# Patient Record
Sex: FEMALE | Race: BLACK OR AFRICAN AMERICAN | NOT HISPANIC OR LATINO | URBAN - METROPOLITAN AREA
[De-identification: names, ages, dates, MRNs, and addresses within clinical notes are randomized per-mention and may not be internally consistent; named-entity substitution may affect disease eponyms.]

---

## 2023-01-27 ENCOUNTER — OFFICE (OUTPATIENT)
Dept: URBAN - METROPOLITAN AREA CLINIC 79 | Facility: CLINIC | Age: 53
End: 2023-01-27

## 2023-01-27 VITALS
DIASTOLIC BLOOD PRESSURE: 85 MMHG | TEMPERATURE: 98.1 F | WEIGHT: 237 LBS | SYSTOLIC BLOOD PRESSURE: 119 MMHG | HEART RATE: 88 BPM | HEIGHT: 69 IN

## 2023-01-27 DIAGNOSIS — Z98.890 OTHER SPECIFIED POSTPROCEDURAL STATES: ICD-10-CM

## 2023-01-27 DIAGNOSIS — K59.09 OTHER CONSTIPATION: ICD-10-CM

## 2023-01-27 DIAGNOSIS — R14.3 FLATULENCE: ICD-10-CM

## 2023-01-27 DIAGNOSIS — R68.81 EARLY SATIETY: ICD-10-CM

## 2023-01-27 DIAGNOSIS — R14.0 ABDOMINAL DISTENSION (GASEOUS): ICD-10-CM

## 2023-01-27 DIAGNOSIS — K21.9 GASTRO-ESOPHAGEAL REFLUX DISEASE WITHOUT ESOPHAGITIS: ICD-10-CM

## 2023-01-27 DIAGNOSIS — E11.9 TYPE 2 DIABETES MELLITUS WITHOUT COMPLICATIONS: ICD-10-CM

## 2023-01-27 DIAGNOSIS — R14.2 ERUCTATION: ICD-10-CM

## 2023-01-27 DIAGNOSIS — R10.13 EPIGASTRIC PAIN: ICD-10-CM

## 2023-01-27 PROCEDURE — 99205 OFFICE O/P NEW HI 60 MIN: CPT | Performed by: PHYSICIAN ASSISTANT

## 2023-01-27 NOTE — SERVICEHPINOTES
Pt is a 52 yr old female here to discuss postprandial abdominal bloating, postprandial epigastric pain, and chronic constipation. She notes symptoms present x 2 months. She notes frequent belching, flatulence worse postprandially. Pain in the epigastrium is present most of the day. No recent gallbladder testing. Pain radiates to the LUQ and not to the RUQ or shoulders. No nocturnal symptoms. She has cut out coffee which didn't help her symptoms. No particular food triggers. She is taking apple cider vinegar gummies for months. Experiences heartburn and indigestion at least 3-4 times per week. No nausea/vomiting. Notes early satiety. She has intentionally lost 50 lbs in the past one year but she is unsure if this can all be attributed to her dietary changes. No dysphagia. She has tried gas-x, Tums, Metamucil--none of that has helped.  
br
brRegarding constipation, she took one of her mother's linzess pills (unclear what dose) and this alleviated the epigastric pain but caused diarrhea. No blood in the stool or upon wiping. Tends to have a BM every 2-3 days of a type 1-2 on the BSS-feels incomplete evacuation. Constipation has been present for the past 1-2 yrs. No prior colonoscopy. One cousin has Crohn's disease, no family hx of CRC. 
br
br
No known heart issues. No chest pain or SOB. Her HA1C is around 11.2%. Used to take insulin but she doesn't take this anymore. Will be seeing an endocrinologist soon. 
br
br
She had a total hysterectomy 2006 and c section in 1991 and 2006.

## 2023-03-16 ENCOUNTER — OFFICE (OUTPATIENT)
Dept: URBAN - METROPOLITAN AREA CLINIC 79 | Facility: CLINIC | Age: 53
End: 2023-03-16

## 2023-03-16 VITALS
SYSTOLIC BLOOD PRESSURE: 123 MMHG | TEMPERATURE: 97.5 F | DIASTOLIC BLOOD PRESSURE: 82 MMHG | HEIGHT: 69 IN | HEART RATE: 86 BPM | WEIGHT: 237 LBS

## 2023-03-16 DIAGNOSIS — E11.9 TYPE 2 DIABETES MELLITUS WITHOUT COMPLICATIONS: ICD-10-CM

## 2023-03-16 DIAGNOSIS — R14.0 ABDOMINAL DISTENSION (GASEOUS): ICD-10-CM

## 2023-03-16 DIAGNOSIS — K59.09 OTHER CONSTIPATION: ICD-10-CM

## 2023-03-16 DIAGNOSIS — R68.81 EARLY SATIETY: ICD-10-CM

## 2023-03-16 DIAGNOSIS — R10.13 EPIGASTRIC PAIN: ICD-10-CM

## 2023-03-16 DIAGNOSIS — K21.9 GASTRO-ESOPHAGEAL REFLUX DISEASE WITHOUT ESOPHAGITIS: ICD-10-CM

## 2023-03-16 PROCEDURE — 99214 OFFICE O/P EST MOD 30 MIN: CPT | Performed by: PHYSICIAN ASSISTANT

## 2023-03-16 NOTE — SERVICEHPINOTES
Pt is here for f/u regarding GERD, epigastric pain that occurs postprandially, constipation, and bloating. She had told me previously that her mother's linzess improved her symptoms. I had ordered an EGD/Colonoscopy which weren't completed by the patient. She would like to reschedule these. Pt is here to discuss the same symptoms as last visit. I explained  to pt that I still need the tests that I ordered on her to figure out what is going on. She tried Nexium OTC which didn't help the epigastric pain. She is now on Toujeo and Mounjaro which she started one month ago. She now has nausea/vomiting--wondering if this is a SE from the medication. She took Magnesium Citrate recently for constipation and that made the epigastric pain go away. Before the Mg Citrate--she would go up to 4 days w/o a BM--they were incomplete. Hasn't tried PEG 3350 more than once per day. She goes next week for an endocrinology appointment--her last HA1C was 13%. She has no other GI related complaints today.